# Patient Record
Sex: MALE | Race: OTHER | HISPANIC OR LATINO | URBAN - METROPOLITAN AREA
[De-identification: names, ages, dates, MRNs, and addresses within clinical notes are randomized per-mention and may not be internally consistent; named-entity substitution may affect disease eponyms.]

---

## 2023-09-21 ENCOUNTER — EMERGENCY (EMERGENCY)
Facility: HOSPITAL | Age: 33
LOS: 1 days | Discharge: HOME | End: 2023-09-21
Attending: STUDENT IN AN ORGANIZED HEALTH CARE EDUCATION/TRAINING PROGRAM
Payer: SELF-PAY

## 2023-09-21 VITALS
HEART RATE: 60 BPM | RESPIRATION RATE: 18 BRPM | SYSTOLIC BLOOD PRESSURE: 120 MMHG | TEMPERATURE: 98 F | WEIGHT: 126.99 LBS | OXYGEN SATURATION: 98 % | HEIGHT: 64.96 IN | DIASTOLIC BLOOD PRESSURE: 72 MMHG

## 2023-09-21 VITALS
SYSTOLIC BLOOD PRESSURE: 120 MMHG | DIASTOLIC BLOOD PRESSURE: 76 MMHG | OXYGEN SATURATION: 98 % | RESPIRATION RATE: 19 BRPM | HEART RATE: 60 BPM | TEMPERATURE: 99 F

## 2023-09-21 PROCEDURE — 25605 CLTX DST RDL FX/EPHYS SEP W/: CPT | Mod: RT

## 2023-09-21 PROCEDURE — 73110 X-RAY EXAM OF WRIST: CPT

## 2023-09-21 PROCEDURE — 99285 EMERGENCY DEPT VISIT HI MDM: CPT | Mod: 25

## 2023-09-21 PROCEDURE — 99285 EMERGENCY DEPT VISIT HI MDM: CPT

## 2023-09-21 PROCEDURE — 99283 EMERGENCY DEPT VISIT LOW MDM: CPT

## 2023-09-21 PROCEDURE — 73130 X-RAY EXAM OF HAND: CPT

## 2023-09-21 PROCEDURE — 73130 X-RAY EXAM OF HAND: CPT | Mod: 26,RT

## 2023-09-21 PROCEDURE — 73090 X-RAY EXAM OF FOREARM: CPT | Mod: 26,LT

## 2023-09-21 PROCEDURE — 73110 X-RAY EXAM OF WRIST: CPT | Mod: 26,RT

## 2023-09-21 PROCEDURE — T1013: CPT

## 2023-09-21 PROCEDURE — 73100 X-RAY EXAM OF WRIST: CPT

## 2023-09-21 PROCEDURE — 73090 X-RAY EXAM OF FOREARM: CPT

## 2023-09-21 PROCEDURE — 73100 X-RAY EXAM OF WRIST: CPT | Mod: 26,RT

## 2023-09-21 RX ORDER — IBUPROFEN 200 MG
1 TABLET ORAL
Qty: 21 | Refills: 0
Start: 2023-09-21 | End: 2023-09-27

## 2023-09-21 RX ORDER — OXYCODONE AND ACETAMINOPHEN 5; 325 MG/1; MG/1
1 TABLET ORAL
Qty: 8 | Refills: 0
Start: 2023-09-21 | End: 2023-09-22

## 2023-09-21 RX ORDER — LIDOCAINE HCL 20 MG/ML
10 VIAL (ML) INJECTION ONCE
Refills: 0 | Status: DISCONTINUED | OUTPATIENT
Start: 2023-09-21 | End: 2023-09-29

## 2023-09-21 NOTE — ED PROVIDER NOTE - CARE PROVIDER_API CALL
Adonis Calderón  Plastic Surgery  [address] [place] [address]  Phone: [phone]  Fax: [phone]  Follow Up Time: 7-10 Days

## 2023-09-21 NOTE — ED PROVIDER NOTE - OBJECTIVE STATEMENT
32-year-old male presents to ED for right wrist pain status post fall at work today.  Patient explained that he slipped from the back of his work truck while at work and tried to brace his fall. Pt admits to  landing on his hand.  Patient admits to pain in right wrist but denies any numbness, weakness or paresthesia.  Patient denies any significant past medical or surgical illness.  Patient denies any current medication or allergies to medication.

## 2023-09-21 NOTE — ED PROVIDER NOTE - PROGRESS NOTE DETAILS
Patient seen by orthopedic PA.  The PA did a bone block and reduction of displacement of distal radius fracture.  Orthopedic PA states that patient will be seen by Dr. Calderón next week for an evaluation and continued management of care.  Patient DC in stable condition and will follow-up as discussed

## 2023-09-21 NOTE — ED PROVIDER NOTE - PATIENT PORTAL LINK FT
You can access the FollowMyHealth Patient Portal offered by [facility] by registering at the following website: [email or web address]. By joining [facility]’s FollowMyHealth portal, you will also be able to view your health information using other applications (apps) compatible with our system.

## 2023-09-21 NOTE — CONSULT NOTE ADULT - SUBJECTIVE AND OBJECTIVE BOX
ORTHO-HAND SERVICE    Pt Name: KIRT GRANADOS    MRN: [id number]      Patient is a 32y RHD Male presenting to the emergency department with a chief complaint of right wrist pain x 1 day. Seen and examined with ED  Garry. Patient states he was jumping out of the back of his truck today when he fell and landed on outstretched right hand. Endorsing painful deformity to right wrist. Denies numbness or tingling. Denies any other injuries.          REVIEW OF SYSTEMS    General: Alert, responsive, in NAD    Skin/Breast: No rashes, no pruritis     Respiratory and Thorax: No difficulty breathing. No cough.  	   Cardiovascular: No chest pain. No palpitations.    Gastrointestinal: No abdominal pain. No diarrhea.     Musculoskeletal: SEE HPI.    Neurological: No sensory or motor changes.     Psychiatric: No anxiety or depression.    ROS is otherwise negative.      PAST MEDICAL & SURGICAL HISTORY:  PAST MEDICAL & SURGICAL HISTORY:      Allergies: No Known Allergies      Medications: lidocaine 1% Injectable 10 milliLiter(s) Local Injection Once      FAMILY HISTORY:  : non-contributory    Social History:   Social History:      Daily Height in cm: 165 (21 Sep 2023 17:03)    Daily                 PHYSICAL EXAM:    Appearance: Alert, responsive, in no acute distress. holding wrist with left arm       Right Upper Extremity: +dorsally angulated deformity to right wrist, TTP, small superficial abrasion to ulnar side of wrist. No active bleeding. SILT. median/ulnar/radial nerve intact. Radial pulse 2+. Cap refill < 2 secs           Imaging Studies:  Xray right wrist/hand: +distal radius fracture, ulnar styloid fracture      A/P:  Pt is a  32y Male with right distal radius fracture, ulnar styloid fracture    PLAN:   * reduced/splinted in ED  * post-reduction xrays obtained  * NWB RUE in sling for comfort  * pain control  * elevation  * Patient can follow up with Dr. Calderón in office next week      FRACTURE REDUCTION  PROCEDURE NOTE: Fracture reduction     Performed by:  Nael Milian PA-C    Indication: Acute fracture with displacement, requiring fracture reduction.    Consent: The risks and benefits of the procedure including incomplete reduction, nerve damage and bleeding were explained and the patient verbalized their understanding and wished to proceed with the procedure. Written consent was obtained following the discussion, ED  Garry present for consent.    Universal Protocol: a time out was performed and the correct patient and site were verified     Procedure: Neurovascular exam intact prior to fracture reduction.  Skin exam : No bleeding or lacerations at the fracture site. Anesthesia/pain control, using aseptic technique, was administered using a hematoma block of 10 ml of 1% lidocaine. Reduction of the right wrist was accomplished via axial traction and careful manipulation. Following adequate reduction and alignment of the fractured bone, the fracture was immobilized with a  plaster splint. Distally, the extremity was neurovascular intact following the procedure.  The patient tolerated the procedure well.    Post reduction films obtained and demonstrated an adequate reduction.    Complications: None

## 2023-09-21 NOTE — ED PROVIDER NOTE - GASTROINTESTINAL NEGATIVE STATEMENT, MLM
Has The Growth Been Previously Biopsied?: has been previously biopsied no abdominal pain, no bloating, no constipation, no diarrhea, no nausea and no vomiting.

## 2023-09-21 NOTE — ED ADULT NURSE NOTE - NSFALLUNIVINTERV_ED_ALL_ED
Bed/Stretcher in lowest position, wheels locked, appropriate side rails in place/Call bell, personal items and telephone in reach/Instruct patient to call for assistance before getting out of bed/chair/stretcher/Non-slip footwear applied when patient is off stretcher/[place] to call system/Physically safe environment - no spills, clutter or unnecessary equipment/Purposeful proactive rounding/Room/bathroom lighting operational, light cord in reach

## 2023-09-21 NOTE — ED PROVIDER NOTE - CLINICAL SUMMARY MEDICAL DECISION MAKING FREE TEXT BOX
32-year-old male presents to ED for right wrist pain status post fall at work today.  Patient explained that he slipped from the back of his work truck while at work and tried to brace his fall. Pt admits to  landing on his hand.  Patient admits to pain in right wrist but denies any numbness, weakness or paresthesia.  Patient denies any significant past medical or surgical illness.  HEENT: Normal findings, Eyes : PERRLA, EOMI , Nares clear and Throat : WNL  Lungs: Clear B/L with good air entry  CVS: S1-S2 , with no murmurs  Abd : Normal BS, with no tenderness or organomegaly  Ext: Deformity to right wrist. Normal sensation. no weakness or paraesthesia.

## 2023-09-21 NOTE — ED PROVIDER NOTE - CARE PLAN
1 Principal Discharge DX:	Fracture of right distal radius  Secondary Diagnosis:	Fracture of right ulnar styloid